# Patient Record
Sex: MALE | Race: WHITE | NOT HISPANIC OR LATINO | URBAN - METROPOLITAN AREA
[De-identification: names, ages, dates, MRNs, and addresses within clinical notes are randomized per-mention and may not be internally consistent; named-entity substitution may affect disease eponyms.]

---

## 2017-02-22 ENCOUNTER — EMERGENCY (EMERGENCY)
Facility: HOSPITAL | Age: 27
LOS: 1 days | Discharge: HOME | End: 2017-02-22
Attending: EMERGENCY MEDICINE | Admitting: EMERGENCY MEDICINE
Payer: COMMERCIAL

## 2017-02-22 VITALS
OXYGEN SATURATION: 99 % | SYSTOLIC BLOOD PRESSURE: 147 MMHG | DIASTOLIC BLOOD PRESSURE: 95 MMHG | RESPIRATION RATE: 20 BRPM | HEART RATE: 105 BPM | TEMPERATURE: 98 F

## 2017-02-22 DIAGNOSIS — S11.90XA UNSPECIFIED OPEN WOUND OF UNSPECIFIED PART OF NECK, INITIAL ENCOUNTER: ICD-10-CM

## 2017-02-22 PROCEDURE — 99282 EMERGENCY DEPT VISIT SF MDM: CPT

## 2017-02-22 PROCEDURE — 99283 EMERGENCY DEPT VISIT LOW MDM: CPT

## 2017-02-22 NOTE — ED PROVIDER NOTE - OBJECTIVE STATEMENT
27 yo  male presents to the ER for evaluation of bleeding to anterior neck.  Pt states I had a skin tag and I used my nail clippers to cut it off this evening and it wont stop bleeding".  Denies pain to site. Pt holding direct pressure but it continues to bleed.

## 2017-02-22 NOTE — ED PROVIDER NOTE - PLAN OF CARE
Return to the ER for any concerns such as  uncontrolled bleeding.  Keep dressing in place until tomorrow.

## 2017-02-22 NOTE — ED PROVIDER NOTE - MEDICAL DECISION MAKING DETAILS
Attending MD Ortiz: 26M presenting with bleeding from skin tag site that he cut with nail clippers, bleeding resolved with sustained pressure. No s/s infection. Patient educated on signs/symptoms of infection, urged to return for recurrent bleeding or s/s infection

## 2017-02-22 NOTE — ED PROVIDER NOTE - PHYSICAL EXAMINATION
Attending MD Ortiz: A & O x 3, NAD, HEENT WNL. +punctate wound over left mid lateral neck, no surrounding erythema, no hematoma

## 2017-02-22 NOTE — ED PROVIDER NOTE - ATTENDING CONTRIBUTION TO CARE
Attending MD Ortiz:  I personally have seen and examined this patient.  NP note reviewed and agree on plan of care and except where noted.  See HPI, PE, and MDM for details.

## 2017-02-22 NOTE — ED PROVIDER NOTE - CARE PLAN
Principal Discharge DX:	Wound Principal Discharge DX:	Wound  Instructions for follow-up, activity and diet:	Return to the ER for any concerns such as  uncontrolled bleeding.  Keep dressing in place until tomorrow.

## 2017-05-13 ENCOUNTER — EMERGENCY (EMERGENCY)
Facility: HOSPITAL | Age: 27
LOS: 1 days | Discharge: HOME | End: 2017-05-13
Attending: EMERGENCY MEDICINE | Admitting: EMERGENCY MEDICINE
Payer: COMMERCIAL

## 2017-05-13 VITALS
RESPIRATION RATE: 16 BRPM | DIASTOLIC BLOOD PRESSURE: 83 MMHG | SYSTOLIC BLOOD PRESSURE: 130 MMHG | OXYGEN SATURATION: 95 % | HEART RATE: 102 BPM | TEMPERATURE: 98 F

## 2017-05-13 DIAGNOSIS — Z88.0 ALLERGY STATUS TO PENICILLIN: ICD-10-CM

## 2017-05-13 DIAGNOSIS — R07.89 OTHER CHEST PAIN: ICD-10-CM

## 2017-05-13 DIAGNOSIS — F17.210 NICOTINE DEPENDENCE, CIGARETTES, UNCOMPLICATED: ICD-10-CM

## 2017-05-13 DIAGNOSIS — Z79.899 OTHER LONG TERM (CURRENT) DRUG THERAPY: ICD-10-CM

## 2017-05-13 LAB
ALBUMIN SERPL ELPH-MCNC: 4.9 G/DL — SIGNIFICANT CHANGE UP (ref 3.3–5)
ALP SERPL-CCNC: 64 U/L — SIGNIFICANT CHANGE UP (ref 40–120)
ALT FLD-CCNC: 59 U/L RC — HIGH (ref 10–45)
AST SERPL-CCNC: 37 U/L — SIGNIFICANT CHANGE UP (ref 10–40)
BASOPHILS # BLD AUTO: 0.1 K/UL — SIGNIFICANT CHANGE UP (ref 0–0.2)
BASOPHILS NFR BLD AUTO: 0.8 % — SIGNIFICANT CHANGE UP (ref 0–2)
BILIRUB SERPL-MCNC: 0.3 MG/DL — SIGNIFICANT CHANGE UP (ref 0.2–1.2)
BUN SERPL-MCNC: 16 MG/DL — SIGNIFICANT CHANGE UP (ref 7–23)
CALCIUM SERPL-MCNC: 9.9 MG/DL — SIGNIFICANT CHANGE UP (ref 8.4–10.5)
CHLORIDE SERPL-SCNC: 101 MMOL/L — SIGNIFICANT CHANGE UP (ref 96–108)
CO2 SERPL-SCNC: 25 MMOL/L — SIGNIFICANT CHANGE UP (ref 22–31)
CREAT SERPL-MCNC: 1.11 MG/DL — SIGNIFICANT CHANGE UP (ref 0.5–1.3)
D DIMER BLD IA.RAPID-MCNC: <150 NG/ML DDU — SIGNIFICANT CHANGE UP
EOSINOPHIL # BLD AUTO: 0.1 K/UL — SIGNIFICANT CHANGE UP (ref 0–0.5)
EOSINOPHIL NFR BLD AUTO: 1.4 % — SIGNIFICANT CHANGE UP (ref 0–6)
GLUCOSE SERPL-MCNC: 114 MG/DL — HIGH (ref 70–99)
HCT VFR BLD CALC: 47.8 % — SIGNIFICANT CHANGE UP (ref 39–50)
HGB BLD-MCNC: 16.4 G/DL — SIGNIFICANT CHANGE UP (ref 13–17)
LYMPHOCYTES # BLD AUTO: 2.2 K/UL — SIGNIFICANT CHANGE UP (ref 1–3.3)
LYMPHOCYTES # BLD AUTO: 23.9 % — SIGNIFICANT CHANGE UP (ref 13–44)
MCHC RBC-ENTMCNC: 31 PG — SIGNIFICANT CHANGE UP (ref 27–34)
MCHC RBC-ENTMCNC: 34.4 GM/DL — SIGNIFICANT CHANGE UP (ref 32–36)
MCV RBC AUTO: 90 FL — SIGNIFICANT CHANGE UP (ref 80–100)
MONOCYTES # BLD AUTO: 0.6 K/UL — SIGNIFICANT CHANGE UP (ref 0–0.9)
MONOCYTES NFR BLD AUTO: 6.3 % — SIGNIFICANT CHANGE UP (ref 2–14)
NEUTROPHILS # BLD AUTO: 6.1 K/UL — SIGNIFICANT CHANGE UP (ref 1.8–7.4)
NEUTROPHILS NFR BLD AUTO: 67.7 % — SIGNIFICANT CHANGE UP (ref 43–77)
PLATELET # BLD AUTO: 259 K/UL — SIGNIFICANT CHANGE UP (ref 150–400)
POTASSIUM SERPL-MCNC: 4.3 MMOL/L — SIGNIFICANT CHANGE UP (ref 3.5–5.3)
POTASSIUM SERPL-SCNC: 4.3 MMOL/L — SIGNIFICANT CHANGE UP (ref 3.5–5.3)
PROT SERPL-MCNC: 7.9 G/DL — SIGNIFICANT CHANGE UP (ref 6–8.3)
RBC # BLD: 5.3 M/UL — SIGNIFICANT CHANGE UP (ref 4.2–5.8)
RBC # FLD: 11.9 % — SIGNIFICANT CHANGE UP (ref 10.3–14.5)
SODIUM SERPL-SCNC: 139 MMOL/L — SIGNIFICANT CHANGE UP (ref 135–145)
TROPONIN T SERPL-MCNC: <0.01 NG/ML — SIGNIFICANT CHANGE UP (ref 0–0.06)
WBC # BLD: 9.1 K/UL — SIGNIFICANT CHANGE UP (ref 3.8–10.5)
WBC # FLD AUTO: 9.1 K/UL — SIGNIFICANT CHANGE UP (ref 3.8–10.5)

## 2017-05-13 PROCEDURE — 71020: CPT | Mod: 26

## 2017-05-13 PROCEDURE — 93005 ELECTROCARDIOGRAM TRACING: CPT

## 2017-05-13 PROCEDURE — 84484 ASSAY OF TROPONIN QUANT: CPT

## 2017-05-13 PROCEDURE — 71046 X-RAY EXAM CHEST 2 VIEWS: CPT

## 2017-05-13 PROCEDURE — 85379 FIBRIN DEGRADATION QUANT: CPT

## 2017-05-13 PROCEDURE — 93010 ELECTROCARDIOGRAM REPORT: CPT | Mod: NC

## 2017-05-13 PROCEDURE — 99283 EMERGENCY DEPT VISIT LOW MDM: CPT | Mod: 25

## 2017-05-13 PROCEDURE — 85027 COMPLETE CBC AUTOMATED: CPT

## 2017-05-13 PROCEDURE — 99285 EMERGENCY DEPT VISIT HI MDM: CPT | Mod: 25

## 2017-05-13 PROCEDURE — 80053 COMPREHEN METABOLIC PANEL: CPT

## 2017-05-13 RX ORDER — LIDOCAINE 4 G/100G
5 CREAM TOPICAL ONCE
Qty: 0 | Refills: 0 | Status: COMPLETED | OUTPATIENT
Start: 2017-05-13 | End: 2017-05-13

## 2017-05-13 RX ORDER — IBUPROFEN 200 MG
600 TABLET ORAL ONCE
Qty: 0 | Refills: 0 | Status: COMPLETED | OUTPATIENT
Start: 2017-05-13 | End: 2017-05-13

## 2017-05-13 RX ADMIN — LIDOCAINE 5 MILLILITER(S): 4 CREAM TOPICAL at 17:17

## 2017-05-13 RX ADMIN — Medication 30 MILLILITER(S): at 17:17

## 2017-05-13 RX ADMIN — Medication 600 MILLIGRAM(S): at 17:17

## 2017-05-13 NOTE — ED ADULT NURSE NOTE - OBJECTIVE STATEMENT
pt has a history of anxiety and has been diagnosed with an ulcer..  here today for chest pain radiating to the back  no sob  ekg done and attached to CRM

## 2017-05-13 NOTE — ED PROVIDER NOTE - CARE PLAN
Principal Discharge DX:	Chest pain of uncertain etiology Principal Discharge DX:	Chest pain of uncertain etiology  Instructions for follow-up, activity and diet:	return for uncontrolled pian, change in pain, dizziness, shortness of breath or ant other concerns. Follow up with your doctor this week

## 2017-05-13 NOTE — ED PROVIDER NOTE - OBJECTIVE STATEMENT
26M PMH hiatal hernia presents with chest pressure.  He developed this sensation of squeezing from front to back for a weeks, last few days has been more in the front than the back with associated small sharp jolts of pain in the L chest. Associated arm aches, R>L, and intermittent nausea however this is chronic due to hiatal hernia.  He has occasional shortness of breath.  Takes car trips to [place] frequently.  No leg swelling or pain. Has not taken anything for pain today.

## 2017-05-13 NOTE — ED PROVIDER NOTE - PLAN OF CARE
return for uncontrolled pian, change in pain, dizziness, shortness of breath or ant other concerns. Follow up with your doctor this week

## 2017-05-13 NOTE — ED PROVIDER NOTE - MEDICAL DECISION MAKING DETAILS
MD Rony,Attending: pt seen and examined, agree with above HPI,.ROS.PE. Sinister cardiopulmonary cause of sxs unlikely . PERC negative/wells negative: For ddimer and one off troponin (no cardiac risk factors0. EKG not suggestive of pericarditis. If workup NAD--home for early follow up with PMD. Has known hiatal hernia--sxs ? reflux elated

## 2018-01-09 ENCOUNTER — APPOINTMENT (OUTPATIENT)
Dept: GASTROENTEROLOGY | Facility: CLINIC | Age: 28
End: 2018-01-09
Payer: SELF-PAY

## 2018-01-09 VITALS
DIASTOLIC BLOOD PRESSURE: 72 MMHG | BODY MASS INDEX: 24.43 KG/M2 | OXYGEN SATURATION: 99 % | HEIGHT: 66 IN | TEMPERATURE: 97.7 F | HEART RATE: 78 BPM | WEIGHT: 152 LBS | SYSTOLIC BLOOD PRESSURE: 122 MMHG | RESPIRATION RATE: 14 BRPM

## 2018-01-09 DIAGNOSIS — K21.9 GASTRO-ESOPHAGEAL REFLUX DISEASE W/OUT ESOPHAGITIS: ICD-10-CM

## 2018-01-09 DIAGNOSIS — Z87.19 PERSONAL HISTORY OF OTHER DISEASES OF THE DIGESTIVE SYSTEM: ICD-10-CM

## 2018-01-09 DIAGNOSIS — Z78.9 OTHER SPECIFIED HEALTH STATUS: ICD-10-CM

## 2018-01-09 DIAGNOSIS — F17.210 NICOTINE DEPENDENCE, CIGARETTES, UNCOMPLICATED: ICD-10-CM

## 2018-01-09 PROCEDURE — 99244 OFF/OP CNSLTJ NEW/EST MOD 40: CPT

## 2018-01-09 RX ORDER — OMEPRAZOLE MAGNESIUM 40 MG/1
40 CAPSULE, DELAYED RELEASE ORAL
Refills: 0 | Status: ACTIVE | COMMUNITY

## 2018-03-09 ENCOUNTER — APPOINTMENT (OUTPATIENT)
Dept: GASTROENTEROLOGY | Facility: CLINIC | Age: 28
End: 2018-03-09

## 2018-07-23 PROBLEM — Z78.9 ALCOHOL USE: Status: ACTIVE | Noted: 2018-01-09

## 2024-02-26 NOTE — ED PROVIDER NOTE - CPE EDP HEAD NORM PED
Airway    Performed by: Allie Golden DO  Authorized by: Allie Golden DO    Final Airway Type:  Supraglottic airway  Final Supraglottic Airway:  IGel  SGA Size*:  4  Attempts*:  1   Patient Identified, Procedure confirmed, Emergency equipment available and Safety protocols followed  Location:  OR  Urgency:  Elective  Difficult Airway: No    Indications for Airway Management:  Anesthesia  Mask Difficulty Assessment:  0 - not attempted  Start Time: 2/26/2024 11:31 AM       Head atraumatic, normal cephalic shape.